# Patient Record
Sex: FEMALE | Race: WHITE | NOT HISPANIC OR LATINO | URBAN - METROPOLITAN AREA
[De-identification: names, ages, dates, MRNs, and addresses within clinical notes are randomized per-mention and may not be internally consistent; named-entity substitution may affect disease eponyms.]

---

## 2019-07-12 ENCOUNTER — EMERGENCY (EMERGENCY)
Facility: HOSPITAL | Age: 44
LOS: 1 days | Discharge: HOME | End: 2019-07-12
Attending: EMERGENCY MEDICINE
Payer: COMMERCIAL

## 2019-07-12 VITALS
RESPIRATION RATE: 18 BRPM | DIASTOLIC BLOOD PRESSURE: 124 MMHG | TEMPERATURE: 98 F | SYSTOLIC BLOOD PRESSURE: 189 MMHG | HEART RATE: 73 BPM | OXYGEN SATURATION: 99 % | WEIGHT: 205.03 LBS | HEIGHT: 65 IN

## 2019-07-12 PROCEDURE — 99284 EMERGENCY DEPT VISIT MOD MDM: CPT

## 2019-07-12 NOTE — ED ADULT TRIAGE NOTE - CHIEF COMPLAINT QUOTE
Migraine x 5 days that has not resolved. Left sided facial numbness x several days. Recently d/c of amlodipine

## 2019-07-13 VITALS
RESPIRATION RATE: 18 BRPM | DIASTOLIC BLOOD PRESSURE: 97 MMHG | OXYGEN SATURATION: 95 % | HEART RATE: 67 BPM | SYSTOLIC BLOOD PRESSURE: 149 MMHG | TEMPERATURE: 98 F

## 2019-07-13 LAB
ALBUMIN SERPL ELPH-MCNC: 4.1 G/DL — SIGNIFICANT CHANGE UP (ref 3.3–5)
ALP SERPL-CCNC: 61 U/L — SIGNIFICANT CHANGE UP (ref 40–120)
ALT FLD-CCNC: 13 U/L — SIGNIFICANT CHANGE UP (ref 10–45)
ANION GAP SERPL CALC-SCNC: 13 MMOL/L — SIGNIFICANT CHANGE UP (ref 5–17)
AST SERPL-CCNC: 15 U/L — SIGNIFICANT CHANGE UP (ref 10–40)
BASOPHILS # BLD AUTO: 0 K/UL — SIGNIFICANT CHANGE UP (ref 0–0.2)
BASOPHILS NFR BLD AUTO: 0.6 % — SIGNIFICANT CHANGE UP (ref 0–2)
BILIRUB SERPL-MCNC: 0.6 MG/DL — SIGNIFICANT CHANGE UP (ref 0.2–1.2)
BUN SERPL-MCNC: 8 MG/DL — SIGNIFICANT CHANGE UP (ref 7–23)
CALCIUM SERPL-MCNC: 8.7 MG/DL — SIGNIFICANT CHANGE UP (ref 8.4–10.5)
CHLORIDE SERPL-SCNC: 101 MMOL/L — SIGNIFICANT CHANGE UP (ref 96–108)
CO2 SERPL-SCNC: 24 MMOL/L — SIGNIFICANT CHANGE UP (ref 22–31)
CREAT SERPL-MCNC: 0.62 MG/DL — SIGNIFICANT CHANGE UP (ref 0.5–1.3)
EOSINOPHIL # BLD AUTO: 0.2 K/UL — SIGNIFICANT CHANGE UP (ref 0–0.5)
EOSINOPHIL NFR BLD AUTO: 2.5 % — SIGNIFICANT CHANGE UP (ref 0–6)
GLUCOSE SERPL-MCNC: 103 MG/DL — HIGH (ref 70–99)
HCG SERPL-ACNC: <2 MIU/ML — SIGNIFICANT CHANGE UP
HCT VFR BLD CALC: 35.3 % — SIGNIFICANT CHANGE UP (ref 34.5–45)
HGB BLD-MCNC: 12 G/DL — SIGNIFICANT CHANGE UP (ref 11.5–15.5)
LYMPHOCYTES # BLD AUTO: 2.2 K/UL — SIGNIFICANT CHANGE UP (ref 1–3.3)
LYMPHOCYTES # BLD AUTO: 26.7 % — SIGNIFICANT CHANGE UP (ref 13–44)
MCHC RBC-ENTMCNC: 31.9 PG — SIGNIFICANT CHANGE UP (ref 27–34)
MCHC RBC-ENTMCNC: 34.1 GM/DL — SIGNIFICANT CHANGE UP (ref 32–36)
MCV RBC AUTO: 93.5 FL — SIGNIFICANT CHANGE UP (ref 80–100)
MONOCYTES # BLD AUTO: 0.5 K/UL — SIGNIFICANT CHANGE UP (ref 0–0.9)
MONOCYTES NFR BLD AUTO: 6.5 % — SIGNIFICANT CHANGE UP (ref 2–14)
NEUTROPHILS # BLD AUTO: 5.2 K/UL — SIGNIFICANT CHANGE UP (ref 1.8–7.4)
NEUTROPHILS NFR BLD AUTO: 63.7 % — SIGNIFICANT CHANGE UP (ref 43–77)
PLATELET # BLD AUTO: 303 K/UL — SIGNIFICANT CHANGE UP (ref 150–400)
POTASSIUM SERPL-MCNC: 3.4 MMOL/L — LOW (ref 3.5–5.3)
POTASSIUM SERPL-SCNC: 3.4 MMOL/L — LOW (ref 3.5–5.3)
PROT SERPL-MCNC: 7.6 G/DL — SIGNIFICANT CHANGE UP (ref 6–8.3)
RBC # BLD: 3.78 M/UL — LOW (ref 3.8–5.2)
RBC # FLD: 11.4 % — SIGNIFICANT CHANGE UP (ref 10.3–14.5)
SODIUM SERPL-SCNC: 138 MMOL/L — SIGNIFICANT CHANGE UP (ref 135–145)
WBC # BLD: 8.1 K/UL — SIGNIFICANT CHANGE UP (ref 3.8–10.5)
WBC # FLD AUTO: 8.1 K/UL — SIGNIFICANT CHANGE UP (ref 3.8–10.5)

## 2019-07-13 PROCEDURE — 96375 TX/PRO/DX INJ NEW DRUG ADDON: CPT | Mod: XU

## 2019-07-13 PROCEDURE — 70498 CT ANGIOGRAPHY NECK: CPT | Mod: 26

## 2019-07-13 PROCEDURE — 70450 CT HEAD/BRAIN W/O DYE: CPT

## 2019-07-13 PROCEDURE — 84702 CHORIONIC GONADOTROPIN TEST: CPT

## 2019-07-13 PROCEDURE — 85027 COMPLETE CBC AUTOMATED: CPT

## 2019-07-13 PROCEDURE — 70498 CT ANGIOGRAPHY NECK: CPT

## 2019-07-13 PROCEDURE — 99284 EMERGENCY DEPT VISIT MOD MDM: CPT | Mod: 25

## 2019-07-13 PROCEDURE — 70450 CT HEAD/BRAIN W/O DYE: CPT | Mod: 26,59

## 2019-07-13 PROCEDURE — 70496 CT ANGIOGRAPHY HEAD: CPT | Mod: 26

## 2019-07-13 PROCEDURE — 80053 COMPREHEN METABOLIC PANEL: CPT

## 2019-07-13 PROCEDURE — 70496 CT ANGIOGRAPHY HEAD: CPT

## 2019-07-13 PROCEDURE — 96374 THER/PROPH/DIAG INJ IV PUSH: CPT | Mod: XU

## 2019-07-13 RX ORDER — KETOROLAC TROMETHAMINE 30 MG/ML
15 SYRINGE (ML) INJECTION ONCE
Refills: 0 | Status: DISCONTINUED | OUTPATIENT
Start: 2019-07-13 | End: 2019-07-13

## 2019-07-13 RX ORDER — SODIUM CHLORIDE 9 MG/ML
1000 INJECTION, SOLUTION INTRAVENOUS ONCE
Refills: 0 | Status: COMPLETED | OUTPATIENT
Start: 2019-07-13 | End: 2019-07-13

## 2019-07-13 RX ORDER — DIPHENHYDRAMINE HCL 50 MG
25 CAPSULE ORAL ONCE
Refills: 0 | Status: COMPLETED | OUTPATIENT
Start: 2019-07-13 | End: 2019-07-13

## 2019-07-13 RX ORDER — METOCLOPRAMIDE HCL 10 MG
10 TABLET ORAL ONCE
Refills: 0 | Status: COMPLETED | OUTPATIENT
Start: 2019-07-13 | End: 2019-07-13

## 2019-07-13 RX ADMIN — Medication 10 MILLIGRAM(S): at 00:15

## 2019-07-13 RX ADMIN — Medication 25 MILLIGRAM(S): at 00:15

## 2019-07-13 RX ADMIN — SODIUM CHLORIDE 1000 MILLILITER(S): 9 INJECTION, SOLUTION INTRAVENOUS at 00:15

## 2019-07-13 RX ADMIN — Medication 15 MILLIGRAM(S): at 03:22

## 2019-07-13 NOTE — ED PROVIDER NOTE - ATTENDING CONTRIBUTION TO CARE
Attending MD Page:  I personally have seen and examined this patient.  Resident note reviewed and agree on plan of care and except where noted.  See HPI, PE, and MDM for details.       Attending MD Page:    Gen:  NAD, oriented x 3  Neck: supple, no swelling, trachea midline  EOMI, PERRL   CV: heart with reg rhythm, no obvious murmur appreciated   Resp: CTAB, breathing comfortably  Abd: soft, NT, ND  Extremities: extremities warm to the touch, no peripheral edema   Msk: no extremity deformities or bony tenderness  Pysch: appropriate affect    Neuro: moves all extremities spontaneously, no gross motor or sensory deficits

## 2019-07-13 NOTE — ED PROVIDER NOTE - NS ED ROS FT
GENERAL: No fever or chills  EYES: no change in vision  HEENT: no trouble swallowing or speaking  CARDIAC: no chest pain or palpitations   PULMONARY: no cough or SOB  GI: no abdominal pain, nausea, vomiting, diarrhea, or constipation   : No changes in urination  SKIN: no rashes  NEURO: +HA and L face paresthesias. no numbness or weakness  MSK: No joint pain     ~Mikel Cristina PGY1

## 2019-07-13 NOTE — ED PROVIDER NOTE - PHYSICAL EXAMINATION
Gen: AAOx3, non-toxic  Head: NCAT  HEENT: no temporal tenderness. EOMI, oral mucosa moist, normal conjunctiva  Lung: CTAB, no respiratory distress, no wheezes/rhonchi/rales B/L, speaking in full sentences  CV: RRR, no murmurs, rubs or gallops  Abd: soft, NTND, no guarding, no CVA tenderness  MSK: no visible deformities  Neuro: No focal sensory or motor deficits, normal CN exam, no meningismus   Skin: Warm, well perfused, no rash  Psych: normal affect.     ~Mikel Cristina PGY1

## 2019-07-13 NOTE — ED PROVIDER NOTE - OBJECTIVE STATEMENT
43F with PMH of HTN p/w headache. 43F with PMH of HTN p/w headache. Gradual onset on Saturday. Described as sharp, intermittent, L-sided frontal/tempera/occipital. No photo/phonophobia. Associated with L-sided facial paresthesias. Denies any other symptoms including fever, chills, visual changes, chest pain, SOB, weakness, abd pain, N/V/D. No history of headaches.

## 2019-07-13 NOTE — ED ADULT NURSE NOTE - NSIMPLEMENTINTERV_GEN_ALL_ED
Implemented All Universal Safety Interventions:  [place] to call system. Call bell, personal items and telephone within reach. Instruct patient to call for assistance. Room bathroom lighting operational. Non-slip footwear when patient is off stretcher. Physically safe environment: no spills, clutter or unnecessary equipment. Stretcher in lowest position, wheels locked, appropriate side rails in place.

## 2019-07-13 NOTE — ED PROVIDER NOTE - NSFOLLOWUPINSTRUCTIONS_ED_ALL_ED_FT
You were seen in the ED for headache and neck pain. Your blood work and CAT scan of the brain were normal.     It is not entirely clear what is causing your symptoms, however it does not appear to be anything immediately dangerous.     You may use Tylenol 650mg every 8 hours or Motrin 600mg every 8 hours as needed for pain. These are over the counter medications.     Please call the number provided to arrange a follow up appointment with our neurology doctors. Please also see your regular doctor in 3-5 days.     Return for worsening pain, fevers or any other concerns.

## 2019-07-13 NOTE — ED ADULT NURSE NOTE - OBJECTIVE STATEMENT
Pt presents to ED with complaint of headache, AXOX3, reports gradual onset of sharp left sided neck and head pain, No fevers or chills, breathing unlabored, symmetrical, no shortness of breath, no chest pain, no nausea vomiting or diarrhea.

## 2019-07-13 NOTE — ED PROVIDER NOTE - CLINICAL SUMMARY MEDICAL DECISION MAKING FREE TEXT BOX
Attending MD Page: 43F with 5 days of intermittent frontal "tension type" headache and neck pain. Grossly non-focal neuro exam, headache was gradual in onset and not instantly peaking thus doubt SAH in this patient. Ddx includes primary HA such as migraine headache vs migraine headache. Will obtain CT head to ro mass, CTA to r/o aneurysm. Patient reportedly had elevated ESR as outpatient, do not suspect headache represents GCA though. Unclear significant of ESR elevation, patient will require outpatient evaluation for this if CT unrevealing

## 2019-07-13 NOTE — ED PROVIDER NOTE - NSFOLLOWUPCLINICS_GEN_ALL_ED_FT
[facility]  Neurology  [address] [place] [address] - [address] [place] [address]  Phone: [phone]  Fax:   Follow Up Time: 4-6 Days

## 2020-08-14 NOTE — ED ADULT NURSE NOTE - NS_NURSE_DISC_TEACHING_YN_ED_ALL_ED
Impression: Dermatochalasis of eyelid: H02.839. Plan: Recommend return to Dr Cain Koch for Lid Evaluation. Yes

## 2020-12-03 ENCOUNTER — OUTPATIENT (OUTPATIENT)
Dept: OUTPATIENT SERVICES | Facility: HOSPITAL | Age: 45
LOS: 1 days | End: 2020-12-03
Payer: COMMERCIAL

## 2020-12-03 VITALS
WEIGHT: 207.9 LBS | RESPIRATION RATE: 17 BRPM | HEIGHT: 65 IN | OXYGEN SATURATION: 100 % | DIASTOLIC BLOOD PRESSURE: 94 MMHG | HEART RATE: 74 BPM | TEMPERATURE: 99 F | SYSTOLIC BLOOD PRESSURE: 143 MMHG

## 2020-12-03 DIAGNOSIS — D64.9 ANEMIA, UNSPECIFIED: ICD-10-CM

## 2020-12-03 DIAGNOSIS — N39.3 STRESS INCONTINENCE (FEMALE) (MALE): ICD-10-CM

## 2020-12-03 DIAGNOSIS — D25.1 INTRAMURAL LEIOMYOMA OF UTERUS: ICD-10-CM

## 2020-12-03 DIAGNOSIS — N81.6 RECTOCELE: ICD-10-CM

## 2020-12-03 DIAGNOSIS — Z01.818 ENCOUNTER FOR OTHER PREPROCEDURAL EXAMINATION: ICD-10-CM

## 2020-12-03 DIAGNOSIS — Z78.9 OTHER SPECIFIED HEALTH STATUS: Chronic | ICD-10-CM

## 2020-12-03 LAB — BLD GP AB SCN SERPL QL: SIGNIFICANT CHANGE UP

## 2020-12-03 PROCEDURE — G0463: CPT

## 2020-12-03 NOTE — H&P PST ADULT - ASSESSMENT
45 yr old female with history of anemia iron infusion received had stress incontinence for sometime with menstrual cycles prolonged and heavy. Pt had 4 pregnancies with increased stress incontinence and rectocele. Pt schedule for supracervical hysterectomy bilateral salpingectomy transobturator tape sling procedure cystoscopy posterior colporrhaphy and perineoplasty excision of vaginal septum on 12/16/2020.

## 2020-12-03 NOTE — H&P PST ADULT - NSICDXPROBLEM_GEN_ALL_CORE_FT
PROBLEM DIAGNOSES  Problem: Rectocele  Assessment and Plan: cystoscopy posterior colphorrhaphy and perineoplasty excision of vaginal septum    Problem: Anemia, unspecified  Assessment and Plan:     Problem: Intramural leiomyoma of uterus  Assessment and Plan: schedule for supracervical hysterectomy bilateral salpingectomy     Problem: Stress incontinence  Assessment and Plan:

## 2020-12-03 NOTE — H&P PST ADULT - MEDICATION ADMINISTRATION INFO, PROFILE
----- Message from Kyree Joseph MD sent at 6/7/2018  8:30 AM CDT -----  Please inform the patient that her 24-hour urine collection was normal.   no concerns

## 2020-12-03 NOTE — H&P PST ADULT - NSICDXPASTMEDICALHX_GEN_ALL_CORE_FT
PAST MEDICAL HISTORY:  Anemia, unspecified     HTN (hypertension) resolved    Intramural leiomyoma of uterus     Rectocele     Stress incontinence, female

## 2020-12-03 NOTE — H&P PST ADULT - NSANTHOSAYNRD_GEN_A_CORE
No. ROLA screening performed.  STOP BANG Legend: 0-2 = LOW Risk; 3-4 = INTERMEDIATE Risk; 5-8 = HIGH Risk

## 2020-12-03 NOTE — H&P PST ADULT - HISTORY OF PRESENT ILLNESS
44 yr old obese female with history of anemia (receives iron infusion on regular bases) has suffered from stress incontinence had 4 pregnancies, fibroids and heavy menstrual cycles. Pt was seen by gyn and scheduled for supracervical hysterectomy bilateral salpingectomy transobturator tape sling procedure cystoscopy posterior colporrhaphy and perineoplasty excision of vaginal spectum on 12/16/2020.

## 2020-12-04 LAB
A1C WITH ESTIMATED AVERAGE GLUCOSE RESULT: 5.3 % — SIGNIFICANT CHANGE UP (ref 4–5.6)
ESTIMATED AVERAGE GLUCOSE: 105 MG/DL — SIGNIFICANT CHANGE UP (ref 68–114)

## 2020-12-15 PROBLEM — N81.6 RECTOCELE: Chronic | Status: ACTIVE | Noted: 2020-12-03

## 2020-12-15 PROBLEM — I10 ESSENTIAL (PRIMARY) HYPERTENSION: Chronic | Status: ACTIVE | Noted: 2019-07-13

## 2020-12-15 PROBLEM — D25.1 INTRAMURAL LEIOMYOMA OF UTERUS: Chronic | Status: ACTIVE | Noted: 2020-12-03

## 2020-12-15 PROBLEM — N39.3 STRESS INCONTINENCE (FEMALE) (MALE): Chronic | Status: ACTIVE | Noted: 2020-12-03

## 2020-12-15 PROBLEM — D64.9 ANEMIA, UNSPECIFIED: Chronic | Status: ACTIVE | Noted: 2020-12-03

## 2020-12-16 ENCOUNTER — INPATIENT (INPATIENT)
Facility: HOSPITAL | Age: 45
LOS: 0 days | Discharge: HOME | DRG: 743 | End: 2020-12-17
Attending: OBSTETRICS & GYNECOLOGY | Admitting: OBSTETRICS & GYNECOLOGY
Payer: COMMERCIAL

## 2020-12-16 VITALS
WEIGHT: 207.9 LBS | SYSTOLIC BLOOD PRESSURE: 153 MMHG | HEIGHT: 65 IN | HEART RATE: 90 BPM | RESPIRATION RATE: 16 BRPM | DIASTOLIC BLOOD PRESSURE: 90 MMHG | TEMPERATURE: 98 F | OXYGEN SATURATION: 99 %

## 2020-12-16 DIAGNOSIS — N81.6 RECTOCELE: ICD-10-CM

## 2020-12-16 DIAGNOSIS — Z78.9 OTHER SPECIFIED HEALTH STATUS: Chronic | ICD-10-CM

## 2020-12-16 DIAGNOSIS — D25.1 INTRAMURAL LEIOMYOMA OF UTERUS: ICD-10-CM

## 2020-12-16 DIAGNOSIS — N39.3 STRESS INCONTINENCE (FEMALE) (MALE): ICD-10-CM

## 2020-12-16 DIAGNOSIS — D64.9 ANEMIA, UNSPECIFIED: ICD-10-CM

## 2020-12-16 LAB
BLD GP AB SCN SERPL QL: SIGNIFICANT CHANGE UP
GLUCOSE BLDC GLUCOMTR-MCNC: 98 MG/DL — SIGNIFICANT CHANGE UP (ref 70–99)
HCG UR QL: NEGATIVE — SIGNIFICANT CHANGE UP

## 2020-12-16 PROCEDURE — 74018 RADEX ABDOMEN 1 VIEW: CPT | Mod: 26

## 2020-12-16 PROCEDURE — 88305 TISSUE EXAM BY PATHOLOGIST: CPT | Mod: 26

## 2020-12-16 PROCEDURE — 88307 TISSUE EXAM BY PATHOLOGIST: CPT | Mod: 26

## 2020-12-16 RX ORDER — SODIUM CHLORIDE 9 MG/ML
3 INJECTION INTRAMUSCULAR; INTRAVENOUS; SUBCUTANEOUS EVERY 8 HOURS
Refills: 0 | Status: DISCONTINUED | OUTPATIENT
Start: 2020-12-16 | End: 2020-12-16

## 2020-12-16 RX ORDER — KETOROLAC TROMETHAMINE 30 MG/ML
30 SYRINGE (ML) INJECTION EVERY 6 HOURS
Refills: 0 | Status: DISCONTINUED | OUTPATIENT
Start: 2020-12-16 | End: 2020-12-17

## 2020-12-16 RX ORDER — SIMETHICONE 80 MG/1
80 TABLET, CHEWABLE ORAL EVERY 6 HOURS
Refills: 0 | Status: DISCONTINUED | OUTPATIENT
Start: 2020-12-16 | End: 2020-12-17

## 2020-12-16 RX ORDER — ASCORBIC ACID 60 MG
500 TABLET,CHEWABLE ORAL DAILY
Refills: 0 | Status: DISCONTINUED | OUTPATIENT
Start: 2020-12-16 | End: 2020-12-17

## 2020-12-16 RX ORDER — MORPHINE SULFATE 50 MG/1
4 CAPSULE, EXTENDED RELEASE ORAL EVERY 4 HOURS
Refills: 0 | Status: DISCONTINUED | OUTPATIENT
Start: 2020-12-16 | End: 2020-12-17

## 2020-12-16 RX ORDER — HYDROMORPHONE HYDROCHLORIDE 2 MG/ML
0.5 INJECTION INTRAMUSCULAR; INTRAVENOUS; SUBCUTANEOUS
Refills: 0 | Status: DISCONTINUED | OUTPATIENT
Start: 2020-12-16 | End: 2020-12-16

## 2020-12-16 RX ORDER — IBUPROFEN 200 MG
600 TABLET ORAL EVERY 6 HOURS
Refills: 0 | Status: DISCONTINUED | OUTPATIENT
Start: 2020-12-16 | End: 2020-12-17

## 2020-12-16 RX ORDER — SENNA PLUS 8.6 MG/1
1 TABLET ORAL
Refills: 0 | Status: DISCONTINUED | OUTPATIENT
Start: 2020-12-16 | End: 2020-12-17

## 2020-12-16 RX ORDER — ONDANSETRON 8 MG/1
8 TABLET, FILM COATED ORAL EVERY 8 HOURS
Refills: 0 | Status: DISCONTINUED | OUTPATIENT
Start: 2020-12-16 | End: 2020-12-17

## 2020-12-16 RX ORDER — SODIUM CHLORIDE 9 MG/ML
1000 INJECTION, SOLUTION INTRAVENOUS
Refills: 0 | Status: DISCONTINUED | OUTPATIENT
Start: 2020-12-16 | End: 2020-12-17

## 2020-12-16 RX ORDER — SIMETHICONE 80 MG/1
80 TABLET, CHEWABLE ORAL EVERY 6 HOURS
Refills: 0 | Status: DISCONTINUED | OUTPATIENT
Start: 2020-12-16 | End: 2020-12-16

## 2020-12-16 RX ORDER — PANTOPRAZOLE SODIUM 20 MG/1
40 TABLET, DELAYED RELEASE ORAL
Refills: 0 | Status: DISCONTINUED | OUTPATIENT
Start: 2020-12-16 | End: 2020-12-17

## 2020-12-16 RX ORDER — ONDANSETRON 8 MG/1
4 TABLET, FILM COATED ORAL ONCE
Refills: 0 | Status: DISCONTINUED | OUTPATIENT
Start: 2020-12-16 | End: 2020-12-16

## 2020-12-16 RX ORDER — CHLORHEXIDINE GLUCONATE 213 G/1000ML
1 SOLUTION TOPICAL DAILY
Refills: 0 | Status: DISCONTINUED | OUTPATIENT
Start: 2020-12-16 | End: 2020-12-16

## 2020-12-16 RX ORDER — OXYCODONE AND ACETAMINOPHEN 5; 325 MG/1; MG/1
1 TABLET ORAL EVERY 4 HOURS
Refills: 0 | Status: DISCONTINUED | OUTPATIENT
Start: 2020-12-16 | End: 2020-12-17

## 2020-12-16 RX ORDER — HYDROMORPHONE HYDROCHLORIDE 2 MG/ML
1 INJECTION INTRAMUSCULAR; INTRAVENOUS; SUBCUTANEOUS
Refills: 0 | Status: DISCONTINUED | OUTPATIENT
Start: 2020-12-16 | End: 2020-12-16

## 2020-12-16 RX ORDER — ACETAMINOPHEN 500 MG
1000 TABLET ORAL ONCE
Refills: 0 | Status: COMPLETED | OUTPATIENT
Start: 2020-12-16 | End: 2020-12-16

## 2020-12-16 RX ADMIN — ONDANSETRON 8 MILLIGRAM(S): 8 TABLET, FILM COATED ORAL at 17:01

## 2020-12-16 RX ADMIN — Medication 400 MILLIGRAM(S): at 23:07

## 2020-12-16 RX ADMIN — SODIUM CHLORIDE 3 MILLILITER(S): 9 INJECTION INTRAMUSCULAR; INTRAVENOUS; SUBCUTANEOUS at 07:03

## 2020-12-16 RX ADMIN — HYDROMORPHONE HYDROCHLORIDE 1 MILLIGRAM(S): 2 INJECTION INTRAMUSCULAR; INTRAVENOUS; SUBCUTANEOUS at 12:50

## 2020-12-16 RX ADMIN — CHLORHEXIDINE GLUCONATE 1 APPLICATION(S): 213 SOLUTION TOPICAL at 07:03

## 2020-12-16 RX ADMIN — Medication 400 MILLIGRAM(S): at 16:57

## 2020-12-16 RX ADMIN — HYDROMORPHONE HYDROCHLORIDE 0.5 MILLIGRAM(S): 2 INJECTION INTRAMUSCULAR; INTRAVENOUS; SUBCUTANEOUS at 13:28

## 2020-12-16 RX ADMIN — SIMETHICONE 80 MILLIGRAM(S): 80 TABLET, CHEWABLE ORAL at 20:46

## 2020-12-16 RX ADMIN — HYDROMORPHONE HYDROCHLORIDE 1 MILLIGRAM(S): 2 INJECTION INTRAMUSCULAR; INTRAVENOUS; SUBCUTANEOUS at 11:41

## 2020-12-16 RX ADMIN — Medication 1000 MILLIGRAM(S): at 23:37

## 2020-12-16 RX ADMIN — SIMETHICONE 80 MILLIGRAM(S): 80 TABLET, CHEWABLE ORAL at 23:08

## 2020-12-16 RX ADMIN — Medication 1000 MILLIGRAM(S): at 17:30

## 2020-12-16 NOTE — CHART NOTE - NSCHARTNOTEFT_GEN_A_CORE
Patient seen at bedsidee resting comfortably offers no new complaints.   Denies HA, CP, SOB, N/V/D,  no bm; dizziness, palpitations, worsening abdominal pain, worsening vaginal bleeding, or any other concerns.     Vital Signs Last 24 Hrs  T(C): 36.7 (16 Dec 2020 16:28), Max: 36.7 (16 Dec 2020 13:55)  T(F): 98 (16 Dec 2020 16:28), Max: 98.1 (16 Dec 2020 13:55)  HR: 95 (16 Dec 2020 16:28) (83 - 96)  BP: 136/75 (16 Dec 2020 16:28) (126/75 - 153/90)  BP(mean): 90 (16 Dec 2020 13:29) (87 - 92)  RR: 16 (16 Dec 2020 16:28) (15 - 21)  SpO2: 96% (16 Dec 2020 16:28) (96% - 100%)    Gen: A&O x 3, NAD  Abdomen: +BS; soft; Nontender, nondistended  Gyn: min vaginal bleeding   Extremities: Nontender, no worsening edema              A/P: POD # 0 s/p supracervical hysterectemy bs;tot; posterior repair and repair of vaginal septum  -cont pain management  -follow up cbc/bmp  -advance diet to regular after flatus  -oob, encourage ambulation  -lovenox DVT ppx  -d/w gino thayer

## 2020-12-16 NOTE — ASU PATIENT PROFILE, ADULT - ACCEPTABLE
GASTROENTEROLOGY CLINIC FOLLOW-UP NOTE    Patient: Clarisse Alexander Date: 11/18/2019   YOB: 1960 Primary Care Physician: Herb Florian MD   59 year old female        Subjective:   The patient is a 59 year old female who is being seen by Gastroenterology for follow-up. Patient was recently treated for Hepatitis C with Mavyret and noted to have SVR 12 weeks. Patient has been having issues with constipation that she has incomplete bowel movements. Patient states that she also has been having pain when she is urinating. Patient also has been having issues with abdominal pain that starts in her back and radiates to her front in the left upper/epigastric pain. Patient reports that she has no nausea/vomiting, but feels very run down. Patient states that this pain has been worse in the last one month or so. Patient has not used cocaine in > 1 year. Patient had a CT abdomen/pelvis with contrast that showed possible pancreatic prominence and possible acute pancreatitis. Patient has been noting worsening early satiety.     Reviewed Pertinent:   Allergies, Medical History, Surgical History, Social History, Family History and Medications    Objective:   Visit Vitals  /70   Pulse 109   Temp 97.7 °F (36.5 °C) (Temporal)   Ht 5' 6\" (1.676 m)   Wt 95.9 kg   SpO2 97%   BMI 34.11 kg/m²     General: Patient appears alert and oriented  Head: Atraumatic, normocephalic  Eyes: No Scleral Icterus  Skin: Exposed areas appear grossly normal. No jaundice  Cardiovascular: RRR (regular rate and rhythm), no murmur  Lungs: Normal breath sounds bilaterally  Abdomen: Soft, nondistended, left sided tenderness to palpation, normal bowel sounds x4  Extremities: No pedal edema or rash or ulcers  Neurologic: No asterixis      Lab Results   Component Value Date    SODIUM 142 11/05/2019    POTASSIUM 4.2 11/05/2019    CHLORIDE 112 (H) 11/05/2019    CO2 22 11/05/2019    GLUCOSE 101 (H) 11/05/2019    BUN 7 11/05/2019    CREATININE 0.72  11/05/2019    DELIA 1.27 03/30/2012    ALBUMIN 3.6 11/05/2019    BILIRUBIN 0.4 11/05/2019    LACTA 1.0 11/16/2014    AST 10 11/05/2019    PHOS 4.1 03/30/2012    INR 1.0 08/22/2019     Lab Results   Component Value Date    PTT 25 05/14/2018    WBC 6.4 11/05/2019    HGB 12.7 11/05/2019    HCT 41.2 11/05/2019     11/05/2019    BNP 65 11/11/2014    RAPDTR <0.02 11/11/2014     06/13/2019    CRP <0.3 07/06/2011    TSH 1.626 06/13/2019       Assessment/Plan:  Patient is a 58yo F with history of hepatitis C s/p treatment, DM2 who is being seen in follow-up.    1. Abdominal pain - Patient having abdominal pain from back radiating to epigastric/left upper quadrant. Patient has an issue with pain on urination as well that is unclear. Patient also having nausea/vomiting with intermittent abdominal pain. Patient has not had an colonoscopy and also has history of constipation and as such, we will plan for EGD/Colonoscopy at this time for investigation of abdominal pain, constipation/screening, and early satiety.  --EGD for abdominal pain  --Colonoscopy for screening/abdominal pain  --Will plan for MRI pancreas protocol to evaluate for pancreatic cancer with prominence with history of pancreatitis and nonspecific GI symptoms    2. Hepatitis C - Patient with Hepatitis C with SVR 12  --Await SVR 24  --No further follow-up needed for HCV at this time      Patient discussed with Dr. Her who agrees with the above plan.    Brandin Irvin MD  Gastroenterology Fellow  11/18/2019  [phone]     0

## 2020-12-16 NOTE — ASU PATIENT PROFILE, ADULT - PMH
Anemia, unspecified    HTN (hypertension)  resolved  Intramural leiomyoma of uterus    Rectocele    Stress incontinence, female

## 2020-12-16 NOTE — ASU PREOP CHECKLIST - SELECT TESTS ORDERED
UCG and T&S sent stat preop as ordered (drawn by MICHELLE Begum)/BMP/CBC/PT/PTT/Type and Screen/Urinalysis/UCG/EKG/CXR/Results in MD note/COVID

## 2020-12-16 NOTE — ASU PREOP CHECKLIST - PATIENT PROBLEMS/NEEDS
COVID negative/Patient expressed no known problems or needs COVID negative 12/13/2020/Patient expressed no known problems or needs

## 2020-12-17 VITALS
SYSTOLIC BLOOD PRESSURE: 132 MMHG | DIASTOLIC BLOOD PRESSURE: 77 MMHG | HEART RATE: 85 BPM | OXYGEN SATURATION: 95 % | RESPIRATION RATE: 16 BRPM | TEMPERATURE: 98 F

## 2020-12-17 LAB
ANION GAP SERPL CALC-SCNC: 8 MMOL/L — SIGNIFICANT CHANGE UP (ref 5–17)
BUN SERPL-MCNC: 5 MG/DL — LOW (ref 7–18)
CALCIUM SERPL-MCNC: 8.2 MG/DL — LOW (ref 8.4–10.5)
CHLORIDE SERPL-SCNC: 107 MMOL/L — SIGNIFICANT CHANGE UP (ref 96–108)
CO2 SERPL-SCNC: 26 MMOL/L — SIGNIFICANT CHANGE UP (ref 22–31)
CREAT SERPL-MCNC: 0.62 MG/DL — SIGNIFICANT CHANGE UP (ref 0.5–1.3)
GLUCOSE SERPL-MCNC: 103 MG/DL — HIGH (ref 70–99)
HCT VFR BLD CALC: 30 % — LOW (ref 34.5–45)
HGB BLD-MCNC: 9.9 G/DL — LOW (ref 11.5–15.5)
MCHC RBC-ENTMCNC: 30.9 PG — SIGNIFICANT CHANGE UP (ref 27–34)
MCHC RBC-ENTMCNC: 33 GM/DL — SIGNIFICANT CHANGE UP (ref 32–36)
MCV RBC AUTO: 93.8 FL — SIGNIFICANT CHANGE UP (ref 80–100)
NRBC # BLD: 0 /100 WBCS — SIGNIFICANT CHANGE UP (ref 0–0)
PLATELET # BLD AUTO: 283 K/UL — SIGNIFICANT CHANGE UP (ref 150–400)
POTASSIUM SERPL-MCNC: 3.3 MMOL/L — LOW (ref 3.5–5.3)
POTASSIUM SERPL-SCNC: 3.3 MMOL/L — LOW (ref 3.5–5.3)
RBC # BLD: 3.2 M/UL — LOW (ref 3.8–5.2)
RBC # FLD: 12.5 % — SIGNIFICANT CHANGE UP (ref 10.3–14.5)
SODIUM SERPL-SCNC: 141 MMOL/L — SIGNIFICANT CHANGE UP (ref 135–145)
WBC # BLD: 8.22 K/UL — SIGNIFICANT CHANGE UP (ref 3.8–10.5)
WBC # FLD AUTO: 8.22 K/UL — SIGNIFICANT CHANGE UP (ref 3.8–10.5)

## 2020-12-17 PROCEDURE — 86901 BLOOD TYPING SEROLOGIC RH(D): CPT

## 2020-12-17 PROCEDURE — 88305 TISSUE EXAM BY PATHOLOGIST: CPT

## 2020-12-17 PROCEDURE — 85027 COMPLETE CBC AUTOMATED: CPT

## 2020-12-17 PROCEDURE — 86900 BLOOD TYPING SEROLOGIC ABO: CPT

## 2020-12-17 PROCEDURE — 82962 GLUCOSE BLOOD TEST: CPT

## 2020-12-17 PROCEDURE — 80048 BASIC METABOLIC PNL TOTAL CA: CPT

## 2020-12-17 PROCEDURE — C1771: CPT

## 2020-12-17 PROCEDURE — 86923 COMPATIBILITY TEST ELECTRIC: CPT

## 2020-12-17 PROCEDURE — 86850 RBC ANTIBODY SCREEN: CPT

## 2020-12-17 PROCEDURE — 88307 TISSUE EXAM BY PATHOLOGIST: CPT

## 2020-12-17 PROCEDURE — 36415 COLL VENOUS BLD VENIPUNCTURE: CPT

## 2020-12-17 PROCEDURE — 81025 URINE PREGNANCY TEST: CPT

## 2020-12-17 PROCEDURE — 74018 RADEX ABDOMEN 1 VIEW: CPT

## 2020-12-17 RX ORDER — PREGABALIN 225 MG/1
1 CAPSULE ORAL
Qty: 0 | Refills: 0 | DISCHARGE

## 2020-12-17 RX ORDER — VALACYCLOVIR 500 MG/1
0 TABLET, FILM COATED ORAL
Qty: 0 | Refills: 0 | DISCHARGE

## 2020-12-17 RX ORDER — ACETAMINOPHEN 500 MG
975 TABLET ORAL EVERY 6 HOURS
Refills: 0 | Status: DISCONTINUED | OUTPATIENT
Start: 2020-12-17 | End: 2020-12-17

## 2020-12-17 RX ORDER — CHOLECALCIFEROL (VITAMIN D3) 125 MCG
50000 CAPSULE ORAL
Qty: 0 | Refills: 0 | DISCHARGE

## 2020-12-17 RX ORDER — PANTOPRAZOLE SODIUM 20 MG/1
1 TABLET, DELAYED RELEASE ORAL
Qty: 0 | Refills: 0 | DISCHARGE

## 2020-12-17 RX ADMIN — SIMETHICONE 80 MILLIGRAM(S): 80 TABLET, CHEWABLE ORAL at 12:48

## 2020-12-17 RX ADMIN — SENNA PLUS 1 TABLET(S): 8.6 TABLET ORAL at 01:30

## 2020-12-17 RX ADMIN — SIMETHICONE 80 MILLIGRAM(S): 80 TABLET, CHEWABLE ORAL at 07:00

## 2020-12-17 RX ADMIN — Medication 975 MILLIGRAM(S): at 13:45

## 2020-12-17 RX ADMIN — Medication 975 MILLIGRAM(S): at 12:49

## 2020-12-17 RX ADMIN — Medication 5 MILLIGRAM(S): at 07:02

## 2020-12-17 RX ADMIN — SENNA PLUS 1 TABLET(S): 8.6 TABLET ORAL at 07:00

## 2020-12-17 RX ADMIN — Medication 500 MILLIGRAM(S): at 01:30

## 2020-12-17 RX ADMIN — PANTOPRAZOLE SODIUM 40 MILLIGRAM(S): 20 TABLET, DELAYED RELEASE ORAL at 01:30

## 2020-12-17 RX ADMIN — Medication 500 MILLIGRAM(S): at 12:49

## 2020-12-17 RX ADMIN — Medication 975 MILLIGRAM(S): at 07:00

## 2020-12-17 RX ADMIN — PANTOPRAZOLE SODIUM 40 MILLIGRAM(S): 20 TABLET, DELAYED RELEASE ORAL at 07:00

## 2020-12-17 RX ADMIN — Medication 975 MILLIGRAM(S): at 07:23

## 2020-12-17 NOTE — DISCHARGE NOTE NURSING/CASE MANAGEMENT/SOCIAL WORK - PATIENT PORTAL LINK FT
You can access the FollowMyHealth Patient Portal offered by [facility] by registering at the following website: [email or web address]. By joining [facility]’s FollowMyHealth portal, you will also be able to view your health information using other applications (apps) compatible with our system.

## 2020-12-17 NOTE — DISCHARGE NOTE PROVIDER - NSDCACTIVITY_GEN_ALL_CORE
Do not drive or operate machinery/Showering allowed/Do not make important decisions/Stairs allowed/Driving allowed/Walking - Indoors allowed/No heavy lifting/straining/Walking - Outdoors allowed

## 2020-12-17 NOTE — DISCHARGE NOTE PROVIDER - HOSPITAL COURSE
S/p supracervical hysterectomy, TOT, cystoscopy, Posterior vaginal repair and excision of vaginal septum.  Uncomplicated post op care.

## 2020-12-17 NOTE — DISCHARGE NOTE PROVIDER - CARE PROVIDER_API CALL
Anson Rice  OBSTETRICS AND GYNECOLOGY  [id number] [address] [place] [id number]  Phone: [phone]  Fax: [phone]  Follow Up Time:

## 2020-12-17 NOTE — DISCHARGE NOTE PROVIDER - NSDCMRMEDTOKEN_GEN_ALL_CORE_FT
B-12 1000 mcg oral tablet: 1 tab(s) orally once a day  busPIRone 5 mg oral tablet: 1 tab(s) orally 2 times a day, As Needed  pantoprazole 20 mg oral delayed release tablet: 1 tab(s) orally once a day, As Needed  Valtrex 1 g oral tablet:   Vitamin D3: [id number]  orally once a week

## 2020-12-18 LAB — SURGICAL PATHOLOGY STUDY: SIGNIFICANT CHANGE UP

## 2021-12-14 NOTE — DISCHARGE NOTE PROVIDER - NSDCCPGOAL_GEN_ALL_CORE_FT
To get better and follow your care plan as instructed.
Assistance with ambulation/Assistance OOB with selected safe patient handling equipment/Communicate Risk of Fall with Harm to all staff/Monitor gait and stability/Reinforce activity limits and safety measures with patient and family/Sit up slowly, dangle for a short time, stand at bedside before walking/Tailored Fall Risk Interventions/Visual Cue: Yellow wristband and red socks/Bed in lowest position, wheels locked, appropriate side rails in place/Call bell, personal items and telephone in reach/Instruct patient to call for assistance before getting out of bed or chair/Non-slip footwear when patient is out of bed/[place] to call system/Physically safe environment - no spills, clutter or unnecessary equipment/Purposeful Proactive Rounding/Room/bathroom lighting operational, light cord in reach

## 2021-12-16 ENCOUNTER — OUTPATIENT (OUTPATIENT)
Dept: OUTPATIENT SERVICES | Facility: HOSPITAL | Age: 46
LOS: 1 days | Discharge: HOME | End: 2021-12-16
Payer: COMMERCIAL

## 2021-12-16 DIAGNOSIS — Z78.9 OTHER SPECIFIED HEALTH STATUS: Chronic | ICD-10-CM

## 2021-12-16 PROCEDURE — 88304 TISSUE EXAM BY PATHOLOGIST: CPT | Mod: 26

## 2021-12-16 RX ORDER — OXYCODONE HYDROCHLORIDE 5 MG/1
1 TABLET ORAL
Qty: 10 | Refills: 0
Start: 2021-12-16

## 2021-12-29 LAB — SURGICAL PATHOLOGY STUDY: SIGNIFICANT CHANGE UP
